# Patient Record
Sex: MALE | Race: BLACK OR AFRICAN AMERICAN | URBAN - METROPOLITAN AREA
[De-identification: names, ages, dates, MRNs, and addresses within clinical notes are randomized per-mention and may not be internally consistent; named-entity substitution may affect disease eponyms.]

---

## 2020-03-18 ENCOUNTER — OFFICE VISIT (OUTPATIENT)
Dept: URGENT CARE | Facility: URGENT CARE | Age: 30
End: 2020-03-18

## 2020-03-18 VITALS — OXYGEN SATURATION: 99 % | DIASTOLIC BLOOD PRESSURE: 84 MMHG | HEART RATE: 68 BPM | SYSTOLIC BLOOD PRESSURE: 126 MMHG

## 2020-03-18 DIAGNOSIS — H57.89 EYE IRRITATION: ICD-10-CM

## 2020-03-18 DIAGNOSIS — T65.894A TOXIC EFFECT OF PEPPER SPRAY, UNDETERMINED INTENT, INITIAL ENCOUNTER: Primary | ICD-10-CM

## 2020-03-18 PROCEDURE — 99203 OFFICE O/P NEW LOW 30 MIN: CPT | Performed by: NURSE PRACTITIONER

## 2020-03-18 ASSESSMENT — ENCOUNTER SYMPTOMS
VOMITING: 0
EYE REDNESS: 1
SHORTNESS OF BREATH: 0
EYE PAIN: 1
DIAPHORESIS: 0
NAUSEA: 0
FEVER: 0
SORE THROAT: 0
COUGH: 0
RHINORRHEA: 0
CHILLS: 0
DIARRHEA: 0

## 2020-03-18 ASSESSMENT — VISUAL ACUITY: OU: 1
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
